# Patient Record
Sex: FEMALE | Race: BLACK OR AFRICAN AMERICAN | NOT HISPANIC OR LATINO | Employment: OTHER | URBAN - METROPOLITAN AREA
[De-identification: names, ages, dates, MRNs, and addresses within clinical notes are randomized per-mention and may not be internally consistent; named-entity substitution may affect disease eponyms.]

---

## 2023-04-07 PROBLEM — G30.9 ALZHEIMER'S DEMENTIA WITHOUT BEHAVIORAL DISTURBANCE, PSYCHOTIC DISTURBANCE, MOOD DISTURBANCE, OR ANXIETY: Status: ACTIVE | Noted: 2023-04-07

## 2023-04-07 PROBLEM — I50.9 CHF (CONGESTIVE HEART FAILURE): Status: ACTIVE | Noted: 2023-04-07

## 2023-04-07 PROBLEM — I63.9 CVA (CEREBRAL VASCULAR ACCIDENT): Status: ACTIVE | Noted: 2023-04-07

## 2023-04-07 PROBLEM — R55 SYNCOPE: Status: ACTIVE | Noted: 2023-04-07

## 2023-04-07 PROBLEM — F02.80 ALZHEIMER'S DEMENTIA WITHOUT BEHAVIORAL DISTURBANCE, PSYCHOTIC DISTURBANCE, MOOD DISTURBANCE, OR ANXIETY: Status: ACTIVE | Noted: 2023-04-07

## 2023-04-07 PROBLEM — G93.40 ACUTE ENCEPHALOPATHY: Status: ACTIVE | Noted: 2023-04-07

## 2023-04-07 PROBLEM — E03.9 HYPOTHYROIDISM: Status: ACTIVE | Noted: 2023-04-07

## 2023-04-08 PROBLEM — R41.82 ALTERED MENTAL STATUS: Status: ACTIVE | Noted: 2023-04-08

## 2023-04-08 PROBLEM — R56.9 SEIZURE-LIKE ACTIVITY: Status: ACTIVE | Noted: 2023-04-08

## 2023-04-08 PROBLEM — Z96.642 HISTORY OF LEFT HIP REPLACEMENT: Status: ACTIVE | Noted: 2023-04-08

## 2023-04-08 PROBLEM — M25.551 RIGHT HIP PAIN: Status: ACTIVE | Noted: 2023-04-08

## 2023-04-09 PROBLEM — R40.20 LOSS OF CONSCIOUSNESS: Status: ACTIVE | Noted: 2023-04-07

## 2023-04-09 PROBLEM — R56.9 SEIZURE-LIKE ACTIVITY: Status: RESOLVED | Noted: 2023-04-08 | Resolved: 2023-04-09

## 2023-04-09 PROBLEM — I50.42 CHRONIC COMBINED SYSTOLIC AND DIASTOLIC HEART FAILURE: Status: ACTIVE | Noted: 2023-04-07

## 2023-04-09 PROBLEM — G93.40 ACUTE ENCEPHALOPATHY: Status: ACTIVE | Noted: 2023-04-09

## 2023-04-09 PROBLEM — G93.41 ACUTE METABOLIC ENCEPHALOPATHY: Status: ACTIVE | Noted: 2023-04-08

## 2023-04-10 PROBLEM — R55 SYNCOPE: Status: ACTIVE | Noted: 2023-04-07

## 2023-04-12 PROBLEM — R55 SYNCOPE: Status: RESOLVED | Noted: 2023-04-07 | Resolved: 2023-04-12

## 2023-04-12 PROBLEM — G93.41 ACUTE METABOLIC ENCEPHALOPATHY: Status: RESOLVED | Noted: 2023-04-08 | Resolved: 2023-04-12

## 2023-04-12 PROBLEM — G93.40 ACUTE ENCEPHALOPATHY: Status: RESOLVED | Noted: 2023-04-09 | Resolved: 2023-04-12

## 2023-04-13 ENCOUNTER — PATIENT OUTREACH (OUTPATIENT)
Dept: ADMINISTRATIVE | Facility: CLINIC | Age: 83
End: 2023-04-13

## 2023-04-13 NOTE — PROGRESS NOTES
C3 nurse spoke with Sarah Cain for a TCC post hospital discharge follow up call. The patient reports does not have a scheduled HOSFU appointment. C3 nurse was unable to schedule [facility]. Patient advised to contact their PCP to schedule a HOSPFU within 5-7 days.

## 2023-04-17 ENCOUNTER — TELEPHONE (OUTPATIENT)
Dept: ADMINISTRATIVE | Facility: CLINIC | Age: 83
End: 2023-04-17

## 2023-04-17 NOTE — PROGRESS NOTES
"Phoned patient in response to reply of "2" to post-discharge texting tracker. Ms. Cain's daughter, Ninfa Montiel, states that the patient is in need of levothyroxine, spironolactone and montelukast. Patient does not have PCP on file. Advised Ms. Thomas that I will send message to discharging provider to see if they would be willing to send e-prescriptions to [facility] on file until Ms. Cain could get in to see a doctor. Informed her I would call her back with the outcome. Ms. Thomas verbalized understanding.  Dr. Khan sent e-prescriptions to [facility] in [place] for levothyroxine, montelukast and spironolactone as requested. Phoned Ms. Thomas to inform her. She verbalized understanding and thanked us for calling.      "

## 2023-07-10 PROBLEM — I63.9 CVA (CEREBRAL VASCULAR ACCIDENT): Status: RESOLVED | Noted: 2023-04-07 | Resolved: 2023-07-10
